# Patient Record
Sex: MALE | Race: WHITE | Employment: STUDENT | URBAN - NONMETROPOLITAN AREA
[De-identification: names, ages, dates, MRNs, and addresses within clinical notes are randomized per-mention and may not be internally consistent; named-entity substitution may affect disease eponyms.]

---
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
## 2021-12-06 ENCOUNTER — TELEPHONE (OUTPATIENT)
Dept: FAMILY MEDICINE CLINIC | Age: 21
End: 2021-12-06

## 2021-12-06 DIAGNOSIS — U07.1 COVID-19: Primary | ICD-10-CM

## 2021-12-06 RX ORDER — HYDROXYCHLOROQUINE SULFATE 200 MG/1
200 TABLET, FILM COATED ORAL 2 TIMES DAILY
Qty: 14 TABLET | Refills: 0 | Status: SHIPPED | OUTPATIENT
Start: 2021-12-06 | End: 2021-12-13

## 2021-12-06 RX ORDER — PREDNISONE 10 MG/1
TABLET ORAL
Qty: 32 TABLET | Refills: 0 | Status: SHIPPED | OUTPATIENT
Start: 2021-12-06 | End: 2022-05-12

## 2021-12-06 RX ORDER — LEVOFLOXACIN 500 MG/1
500 TABLET, FILM COATED ORAL DAILY
Qty: 10 TABLET | Refills: 0 | Status: SHIPPED | OUTPATIENT
Start: 2021-12-06 | End: 2021-12-16

## 2021-12-06 NOTE — TELEPHONE ENCOUNTER
Daily zinc, vitamin D and C  Eat 4-5 small meals daily  Push water and gatorade 60-80 oz daily  Walk 10 minutes every 1.5 hours  Quarantine ends on 12-12-21    Call mom

## 2021-12-06 NOTE — TELEPHONE ENCOUNTER
Contacted patient's mother Read Ana Laura. Mom states patient did a home COVID test and tested positive on Sat 12/4. Mom was just wanting guidelines for patient regarding treatment and quarantine. Discussed with mother. Mom states patient is having body aches and chills, O2 is staying at 96%. Symptoms started on Thursday 12/2. Mom states patient has finals next week in school and is very concerned about not getting better. Mom asking about hydroxychloroquine or anything else that patient can take to help him get better.      Please advise

## 2022-02-23 ENCOUNTER — NURSE ONLY (OUTPATIENT)
Dept: FAMILY MEDICINE CLINIC | Age: 22
End: 2022-02-23
Payer: COMMERCIAL

## 2022-02-23 DIAGNOSIS — J30.1 NON-SEASONAL ALLERGIC RHINITIS DUE TO POLLEN: Primary | ICD-10-CM

## 2022-02-23 PROCEDURE — 95117 IMMUNOTHERAPY INJECTIONS: CPT | Performed by: NURSE PRACTITIONER

## 2022-02-23 PROCEDURE — 99999 PR OFFICE/OUTPT VISIT,PROCEDURE ONLY: CPT | Performed by: NURSE PRACTITIONER

## 2022-02-23 NOTE — PROGRESS NOTES
Administrations This Visit     ALLERGEN EXTRACT 0.2 mL     Admin Date  02/23/2022  16:33 Action  Given Dose  0.2 mL Route  SubCUTAneous Site  Arm Right Administered By  Mick Segura CMA ([address] [place] [address])    Ordering Provider: MARV Martínez CNP    Patient Supplied?: Yes    Comments: 0.5ml subcutaneous right upper arm   vial tr gr wd           Admin Date  02/23/2022  16:34 Action  Given Dose  0.2 mL Route  SubCUTAneous Site  Arm Right Administered By  Mick Segura CMA ([address] [place] [address])    Ordering Provider: MARV Martínez CNP    Patient Supplied?: Yes    Comments: 0.5ml subcutaneous right upper middle arm  vial mold           Admin Date  02/23/2022  16:35 Action  Given Dose  0.2 mL Route  SubCUTAneous Site  Arm Left Administered By  Mick Segura CMA ([address] [place] [address])    Ordering Provider: MARV Martínez CNP    Patient Supplied?: Yes    Comments: 0.5ml subcutaneous left upper arm   vial dmp dmf cat dog                Patient instructed to remain in clinic for 20 minutes after injection and was advised to report any adverse reaction to me immediately. Pt supplied  Right upper injection swelling, swelling did decrease after additional 5 minutes of waiting. Advised pt to apply ice at home and take benadryl if needed   Minimal swelling to two other sites.

## 2022-03-21 ENCOUNTER — NURSE ONLY (OUTPATIENT)
Dept: FAMILY MEDICINE CLINIC | Age: 22
End: 2022-03-21
Payer: COMMERCIAL

## 2022-03-21 DIAGNOSIS — Z51.6 ALLERGY DESENSITIZATION THERAPY: ICD-10-CM

## 2022-03-21 PROCEDURE — 95117 IMMUNOTHERAPY INJECTIONS: CPT | Performed by: NURSE PRACTITIONER

## 2022-03-21 PROCEDURE — 99999 PR OFFICE/OUTPT VISIT,PROCEDURE ONLY: CPT | Performed by: FAMILY MEDICINE

## 2022-03-21 NOTE — PROGRESS NOTES
Administrations This Visit     ALLERGEN EXTRACT 0.2 mL     Admin Date  03/21/2022  15:56 Action  Given Dose  0.2 mL Route  SubCUTAneous Site  Arm Right Administered By  Avril Soto LPN    Ordering Provider: MARV Coleman CNP    Patient Supplied?: Yes    Comments: Red vial C  dose 0.5mL  right middle arm           Admin Date  03/21/2022  15:57 Action  Given Dose  0.2 mL Route  SubCUTAneous Site  Arm Right Administered By  Avril Soto LPN    Ordering Provider: MARV Coleman CNP    Patient Supplied?: Yes    Comments: Vial A red  dose 0.5mL  right upper arm           Admin Date  03/21/2022  15:58 Action  Given Dose  0.2 mL Route  SubCUTAneous Site  Arm Left Administered By  Avril Soto LPN    Ordering Provider: MARV Coleman CNP    Patient Supplied?: Yes    Comments: Red vial B  Dose 0.5mL  left upper arm                Patient instructed to remain in clinic for 20 minutes after injection and was advised to report any adverse reaction to me immediately.

## 2022-04-27 ENCOUNTER — NURSE ONLY (OUTPATIENT)
Dept: FAMILY MEDICINE CLINIC | Age: 22
End: 2022-04-27
Payer: COMMERCIAL

## 2022-04-27 DIAGNOSIS — Z51.6 DESENSITIZATION TO ALLERGENS: ICD-10-CM

## 2022-04-27 PROCEDURE — 95117 IMMUNOTHERAPY INJECTIONS: CPT | Performed by: NURSE PRACTITIONER

## 2022-04-27 PROCEDURE — 99999 PR OFFICE/OUTPT VISIT,PROCEDURE ONLY: CPT | Performed by: NURSE PRACTITIONER

## 2022-04-27 NOTE — PROGRESS NOTES
Administrations This Visit     ALLERGEN EXTRACT 0.2 mL     Admin Date  04/27/2022  16:00 Action  Given Dose  0.2 mL Route  SubCUTAneous Site  Arm Right Administered By  Stefan Kowalski LPN    Ordering Provider: MARV Dorado CNP    Patient Supplied?: Yes    Comments: right middle arm  dose 0.2mL           Admin Date  04/27/2022  16:03 Action  Given Dose  0.2 mL Route  SubCUTAneous Site  Arm Right Administered By  Stefan Kowalski LPN    Ordering Provider: MARV Dorado CNP    Patient Supplied?: Yes    Comments: right arm   dose 0.2mL           Admin Date  04/27/2022  16:04 Action  Given Dose  0.2 mL Route  SubCUTAneous Site  Arm Left Administered By  Stefan Kowalski LPN    Ordering Provider: MARV Dorado CNP    Patient Supplied?: Yes    Comments: left upper arm  dose 0.2mL                Patient instructed to remain in clinic for 20 minutes after injection and was advised to report any adverse reaction to me immediately.

## 2022-05-11 NOTE — PROGRESS NOTES
[address] [place], [place] [address] Stefan Castro  Dept: [phone]  Dept Fax: [phone]  Loc: Rubens Delatorre is a 22 y.o. male who presents today for:  Chief Complaint   Patient presents with    Allergies           HPI:     HPI  Well Adult Physical: Patient here for a comprehensive physical exam.The patient reports no problems  Do you take any herbs or supplements that were not prescribed by a doctor? no Are you taking calcium supplements? no Are you taking aspirin daily? no   History:  Any STD's in the past? none    Has seasonal allergies and a history of asthma. He is no longer using symbicort   Albuterol prn when he is sick. Still receiving allergy shots,       Reviewed chart forpast medical history , surgical history , allergies, social history , family history and medications. Health Maintenance   Topic Date Due    COVID-19 Vaccine (1) Never done    Pneumococcal 0-64 years Vaccine (1 - PCV) Never done    HPV vaccine (1 - Male 2-dose series) Never done    HIV screen  Never done    Hepatitis C screen  Never done    Flu vaccine (Season Ended) 09/01/2022    Depression Screen  05/12/2023    DTaP/Tdap/Td vaccine (7 - Td or Tdap) 06/24/2024    Hepatitis B vaccine  Completed    Hib vaccine  Completed    Varicella vaccine  Completed    Meningococcal (ACWY) vaccine  Completed    Hepatitis A vaccine  Aged Out       Subjective:      Constitutional:Negative for fever, chills, diaphoresis, activity change, appetite change and fatigue. HENT: Negative for hearing loss, ear pain, congestion, sore throat, rhinorrhea, postnasal drip and ear discharge. Eyes: Negative for photophobia and visual disturbance. Respiratory: Negative for cough, chest tightness, shortness of breath and wheezing. Cardiovascular: Negative for chest pain and leg swelling.    Gastrointestinal: Negative for nausea, vomiting, abdominal pain, diarrhea and constipation. Genitourinary: Negative for dysuria, urgency and frequency. Neurological: Negative for weakness, light-headedness and headaches. Psychiatric/Behavioral: Negative for sleep disturbance.      :     Vitals:    05/12/22 1007   BP: 120/70   Site: Left Upper Arm   Position: Sitting   Cuff Size: Large Adult   Pulse: 63   Resp: 16   Temp: 97.9 °F (36.6 °C)   TempSrc: Temporal   SpO2: 96%   Weight: 228 lb (103.4 kg)   Height: 6' 5.01\" (1.956 m)     Wt Readings from Last 3 Encounters:   05/12/22 228 lb (103.4 kg)   07/25/17 191 lb 9.6 oz (86.9 kg) (96 %, Z= 1.76)*   02/14/17 179 lb 9.6 oz (81.5 kg) (94 %, Z= 1.59)*     * Growth percentiles are based on [facility] (Boys, 2-20 Years) data. Physical Exam  Physical Exam   Constitutional: Vital signs are normal. He appears well-developed and well-nourished. He is active. HENT:   Head: Normocephalic and atraumatic. Right Ear: Tympanic membrane, external ear and ear canal normal. No drainage or tenderness. Left Ear: Tympanic membrane, external ear and ear canal normal. No drainage or tenderness. Nose: Nose normal. No mucosal edema or rhinorrhea. Mouth/Throat: Uvula is midline, oropharynx is clear and moist and mucous membranes are normal. Mucous membranes are not pale. Normal dentition. No posterior oropharyngeal edema or posterior oropharyngeal erythema. Eyes: Lids are normal. Right eye exhibits no chemosis and no discharge. Left eye exhibits no chemosis and no drainage. Right conjunctiva has no hemorrhage. Left conjunctiva has no hemorrhage. Right eye exhibits normal extraocular motion. Left eye exhibits normal extraocular motion. Right pupil is round and reactive. Left pupil is round and reactive. Pupils are equal.   Cardiovascular: Normal rate, regular rhythm, S1 normal, S2 normal and normal heart sounds. Exam reveals no gallop. No murmur heard.   Pulmonary/Chest: Effort normal and breath sounds normal. No respiratory distress. He has no wheezes. He has no rhonchi. He has no rales. Abdominal: Soft. Normal appearance and bowel sounds are normal. He exhibits no distension and no mass. There is no hepatosplenomegaly. No tenderness. He has no rigidity, no rebound and no guarding. No hernia. Musculoskeletal:        Right lower leg: He exhibits no edema. Left lower leg: He exhibits no edema. Neurological: He is alert. Oriented and pleasent        Assessment/Plan   Minus Meuse was seen today for allergies. Diagnoses and all orders for this visit:    Routine general medical examination at a health care facility  -     CBC; Future  -     Comprehensive Metabolic Panel, Fasting; Future  -     Lipid Panel; Future  -     TSH with Reflex; Future    Non-seasonal allergic rhinitis due to pollen    Mild intermittent asthma without complication    No change to medications   Continue healthy diet and exercise    Discussed use, benefit, and side effectsof prescribed medications. All patient questions answered. Pt voiced understanding. Reviewed health maintenance. Instructed to continue current medications, diet and exercise. Patient agreed with treatment plan. Followup as directed.      Electronically signed by Edel Kern MD

## 2022-05-12 ENCOUNTER — OFFICE VISIT (OUTPATIENT)
Dept: FAMILY MEDICINE CLINIC | Age: 22
End: 2022-05-12
Payer: COMMERCIAL

## 2022-05-12 VITALS
HEART RATE: 63 BPM | HEIGHT: 77 IN | SYSTOLIC BLOOD PRESSURE: 120 MMHG | TEMPERATURE: 97.9 F | RESPIRATION RATE: 16 BRPM | DIASTOLIC BLOOD PRESSURE: 70 MMHG | WEIGHT: 228 LBS | BODY MASS INDEX: 26.92 KG/M2 | OXYGEN SATURATION: 96 %

## 2022-05-12 DIAGNOSIS — J30.1 NON-SEASONAL ALLERGIC RHINITIS DUE TO POLLEN: ICD-10-CM

## 2022-05-12 DIAGNOSIS — Z00.00 ROUTINE GENERAL MEDICAL EXAMINATION AT A HEALTH CARE FACILITY: Primary | ICD-10-CM

## 2022-05-12 DIAGNOSIS — J45.20 MILD INTERMITTENT ASTHMA WITHOUT COMPLICATION: ICD-10-CM

## 2022-05-12 PROCEDURE — 99385 PREV VISIT NEW AGE 18-39: CPT | Performed by: FAMILY MEDICINE

## 2022-05-12 SDOH — ECONOMIC STABILITY: FOOD INSECURITY: WITHIN THE PAST 12 MONTHS, THE FOOD YOU BOUGHT JUST DIDN'T LAST AND YOU DIDN'T HAVE MONEY TO GET MORE.: NEVER TRUE

## 2022-05-12 SDOH — ECONOMIC STABILITY: FOOD INSECURITY: WITHIN THE PAST 12 MONTHS, YOU WORRIED THAT YOUR FOOD WOULD RUN OUT BEFORE YOU GOT MONEY TO BUY MORE.: NEVER TRUE

## 2022-05-12 ASSESSMENT — PATIENT HEALTH QUESTIONNAIRE - PHQ9
SUM OF ALL RESPONSES TO PHQ QUESTIONS 1-9: 0
SUM OF ALL RESPONSES TO PHQ QUESTIONS 1-9: 0
1. LITTLE INTEREST OR PLEASURE IN DOING THINGS: 0
2. FEELING DOWN, DEPRESSED OR HOPELESS: 0
SUM OF ALL RESPONSES TO PHQ QUESTIONS 1-9: 0
SUM OF ALL RESPONSES TO PHQ QUESTIONS 1-9: 0
SUM OF ALL RESPONSES TO PHQ9 QUESTIONS 1 & 2: 0

## 2022-05-12 ASSESSMENT — SOCIAL DETERMINANTS OF HEALTH (SDOH): HOW HARD IS IT FOR YOU TO PAY FOR THE VERY BASICS LIKE FOOD, HOUSING, MEDICAL CARE, AND HEATING?: NOT HARD AT ALL

## 2022-06-10 ENCOUNTER — NURSE ONLY (OUTPATIENT)
Dept: FAMILY MEDICINE CLINIC | Age: 22
End: 2022-06-10
Payer: COMMERCIAL

## 2022-06-10 DIAGNOSIS — Z51.6 ALLERGY DESENSITIZATION THERAPY: ICD-10-CM

## 2022-06-10 PROCEDURE — 99999 PR OFFICE/OUTPT VISIT,PROCEDURE ONLY: CPT | Performed by: NURSE PRACTITIONER

## 2022-06-10 PROCEDURE — 95117 IMMUNOTHERAPY INJECTIONS: CPT | Performed by: NURSE PRACTITIONER

## 2022-06-10 NOTE — PROGRESS NOTES
Administrations This Visit     ALLERGEN EXTRACT 0.2 mL     Admin Date  06/10/2022  10:30 Action  Given Dose  0.2 mL Route  SubCUTAneous Site  Arm Right Administered By  Dawit Ferreira LPN    Ordering Provider: MARV Tillman CNP    Patient Supplied?: Yes    Comments: vial B  right middle arm   dose 0.3mL           Admin Date  06/10/2022  10:31 Action  Given Dose  0.2 mL Route  SubCUTAneous Site  Arm Right Administered By  Dawit Ferreira LPN    Ordering Provider: MARV Tillman CNP    Patient Supplied?: Yes    Comments: Vial A  dose 0.3mL  right upper arm           Admin Date  06/10/2022  10:32 Action  Given Dose  0.2 mL Route  SubCUTAneous Site  Arm Left Administered By  Dawit Ferreira LPN    Ordering Provider: MARV Tillman CNP    Patient Supplied?: Yes    Comments: vial C  left upper arm   dose 0.3mL                Patient instructed to remain in clinic for 20 minutes after injection and was advised to report any adverse reaction to me immediately.

## 2022-09-20 ENCOUNTER — NURSE ONLY (OUTPATIENT)
Dept: FAMILY MEDICINE CLINIC | Age: 22
End: 2022-09-20
Payer: COMMERCIAL

## 2022-09-20 PROCEDURE — 95117 IMMUNOTHERAPY INJECTIONS: CPT | Performed by: NURSE PRACTITIONER

## 2022-10-11 ENCOUNTER — NURSE ONLY (OUTPATIENT)
Dept: FAMILY MEDICINE CLINIC | Age: 22
End: 2022-10-11

## 2022-10-11 DIAGNOSIS — Z51.6 ALLERGY DESENSITIZATION THERAPY: Primary | ICD-10-CM

## 2022-10-11 PROCEDURE — 99999 PR OFFICE/OUTPT VISIT,PROCEDURE ONLY: CPT | Performed by: NURSE PRACTITIONER

## 2022-10-31 ENCOUNTER — NURSE ONLY (OUTPATIENT)
Dept: FAMILY MEDICINE CLINIC | Age: 22
End: 2022-10-31
Payer: COMMERCIAL

## 2022-10-31 DIAGNOSIS — Z51.6 DESENSITIZATION TO ALLERGY SHOT: Primary | ICD-10-CM

## 2022-10-31 PROCEDURE — 95117 IMMUNOTHERAPY INJECTIONS: CPT | Performed by: NURSE PRACTITIONER

## 2022-10-31 PROCEDURE — 99999 PR OFFICE/OUTPT VISIT,PROCEDURE ONLY: CPT | Performed by: NURSE PRACTITIONER

## 2022-10-31 NOTE — PROGRESS NOTES
Administrations This Visit       ALLERGEN EXTRACT 0.2 mL       Admin Date  10/31/2022  16:00 Action  Given Dose  0.2 mL Route  SubCUTAneous Site  Arm Right Administered By  Isidro Suresh LPN    Ordering Provider: MARV Niño CNP    Patient Supplied?: Yes    Comments: Vial C  dose 0.5mL  right middle arm               Admin Date  10/31/2022  16:10 Action  Given Dose  0.2 mL Route  SubCUTAneous Site  Arm Left Administered By  Isidro Suresh LPN    Ordering Provider: MARV Niño CNP    Patient Supplied?: Yes    Comments: Vial A  does 0.5ml  left upper arm               Admin Date  10/31/2022  16:11 Action  Given Dose  0.2 mL Route  SubCUTAneous Site  Arm Right Administered By  Isidro Suresh LPN    Ordering Provider: MARV Niño CNP    Patient Supplied?: Yes    Comments: vial B   dose 0.5mL   right upper arm                    Patient instructed to remain in clinic for 20 minutes after injection and was advised to report any adverse reaction to me immediately.

## 2022-11-29 ENCOUNTER — NURSE ONLY (OUTPATIENT)
Dept: FAMILY MEDICINE CLINIC | Age: 22
End: 2022-11-29

## 2022-11-29 DIAGNOSIS — Z51.6 DESENSITIZATION TO ALLERGENS: ICD-10-CM

## 2022-11-29 DIAGNOSIS — J30.81 ALLERGIC RHINITIS DUE TO ANIMAL HAIR AND DANDER: Primary | ICD-10-CM

## 2022-11-29 DIAGNOSIS — J30.1 ALLERGIC RHINITIS DUE TO POLLEN, UNSPECIFIED SEASONALITY: ICD-10-CM

## 2022-11-29 PROCEDURE — 99999 PR OFFICE/OUTPT VISIT,PROCEDURE ONLY: CPT | Performed by: NURSE PRACTITIONER

## 2022-11-29 NOTE — PROGRESS NOTES
Administrations This Visit       ALLERGEN EXTRACT 0.2 mL       Admin Date  11/29/2022  15:32 Action  Given Dose  0.2 mL Route  SubCUTAneous Site  Arm Left Administered By  Nelida Macias LPN    Ordering Provider: MARV Sow CNP    Patient Supplied?: Yes    Comments: 0.5mL  vial [id number]  exp: 3/31/23               Admin Date  11/29/2022  15:33 Action  Given Dose  0.2 mL Route  SubCUTAneous Site  Arm Right Administered By  Nelida Macias LPN    Ordering Provider: MARV Sow CNP    Patient Supplied?: Yes    Comments: upper  vial [id number]  exp: 3/31/23               Admin Date  11/29/2022  15:34 Action  Given Dose  0.2 mL Route  SubCUTAneous Site  Arm Right Administered By  Nelida Macias LPN    Ordering Provider: MARV Sow CNP    Patient Supplied?: Yes    Comments: lower  0.5mL  vial [id number]  exp: 3/31/23                    Patient instructed to remain in clinic for 20 minutes after injection and was advised to report any adverse reaction to me immediately. No redness, wheal, or reactions noted.

## 2022-12-20 ENCOUNTER — NURSE ONLY (OUTPATIENT)
Dept: FAMILY MEDICINE CLINIC | Age: 22
End: 2022-12-20
Payer: COMMERCIAL

## 2022-12-20 DIAGNOSIS — Z51.6 DESENSITIZATION TO ALLERGENS: Primary | ICD-10-CM

## 2022-12-20 PROCEDURE — 95117 IMMUNOTHERAPY INJECTIONS: CPT | Performed by: NURSE PRACTITIONER

## 2022-12-20 PROCEDURE — 99999 PR OFFICE/OUTPT VISIT,PROCEDURE ONLY: CPT | Performed by: NURSE PRACTITIONER

## 2023-06-27 RX ORDER — ALBUTEROL SULFATE 90 UG/1
AEROSOL, METERED RESPIRATORY (INHALATION)
Qty: 3 EACH | Refills: 3 | Status: SHIPPED | OUTPATIENT
Start: 2023-06-27

## 2023-08-14 ENCOUNTER — NURSE ONLY (OUTPATIENT)
Dept: FAMILY MEDICINE CLINIC | Age: 23
End: 2023-08-14
Payer: COMMERCIAL

## 2023-08-14 DIAGNOSIS — Z29.8 IMMUNOTHERAPY: Primary | ICD-10-CM

## 2023-08-14 PROCEDURE — 99999 PR OFFICE/OUTPT VISIT,PROCEDURE ONLY: CPT | Performed by: NURSE PRACTITIONER

## 2023-08-14 PROCEDURE — 95117 IMMUNOTHERAPY INJECTIONS: CPT | Performed by: NURSE PRACTITIONER

## 2023-08-15 NOTE — PROGRESS NOTES
Administrations This Visit       ALLERGEN EXTRACT 0.2 mL       Admin Date  08/15/2023  16:49 Action  Given Dose  0.5 mL Route  SubCUTAneous Site  Arm Right Administered By  Abel Foster LPN    Ordering Provider: MARV West CNP    Patient Supplied?: Yes               Admin Date  08/15/2023  16:50 Action  Given Dose  0.5 mL Route  SubCUTAneous Site  Arm Left Administered By  Abel Foster LPN    Ordering Provider: MARV West CNP    Patient Supplied?: Yes               Admin Date  08/15/2023  16:51 Action  Given Dose  0.5 mL Route  SubCUTAneous Site  Arm Right Administered By  Abel Foster LPN    Ordering Provider: MARV West CNP    Patient Supplied?: Yes                    Patient instructed to remain in clinic for 20 minutes after injection and was advised to report any adverse reaction to me immediately.

## 2023-08-22 ENCOUNTER — TELEPHONE (OUTPATIENT)
Dept: FAMILY MEDICINE CLINIC | Age: 23
End: 2023-08-22

## 2023-08-22 RX ORDER — METHYLPREDNISOLONE 4 MG/1
TABLET ORAL
Qty: 1 KIT | Refills: 0 | Status: SHIPPED | OUTPATIENT
Start: 2023-08-22 | End: 2023-08-28

## 2023-08-22 NOTE — TELEPHONE ENCOUNTER
Pt mom called stating that pt has poison ivy on legs and hands   Started 4 days.   Tried calamine lotion and did not help

## 2025-01-07 ENCOUNTER — TELEPHONE (OUTPATIENT)
Dept: FAMILY MEDICINE CLINIC | Age: 25
End: 2025-01-07

## 2025-01-07 ENCOUNTER — OFFICE VISIT (OUTPATIENT)
Dept: FAMILY MEDICINE CLINIC | Age: 25
End: 2025-01-07

## 2025-01-07 VITALS
BODY MASS INDEX: 27.39 KG/M2 | OXYGEN SATURATION: 98 % | TEMPERATURE: 97.8 F | DIASTOLIC BLOOD PRESSURE: 68 MMHG | HEART RATE: 67 BPM | SYSTOLIC BLOOD PRESSURE: 116 MMHG | HEIGHT: 77 IN | WEIGHT: 232 LBS | RESPIRATION RATE: 16 BRPM

## 2025-01-07 DIAGNOSIS — H66.003 NON-RECURRENT ACUTE SUPPURATIVE OTITIS MEDIA OF BOTH EARS WITHOUT SPONTANEOUS RUPTURE OF TYMPANIC MEMBRANES: Primary | ICD-10-CM

## 2025-01-07 DIAGNOSIS — K12.1 STOMATITIS: ICD-10-CM

## 2025-01-07 DIAGNOSIS — J45.20 MILD INTERMITTENT ASTHMA WITHOUT COMPLICATION: ICD-10-CM

## 2025-01-07 RX ORDER — IBUPROFEN 200 MG
400 TABLET ORAL EVERY 6 HOURS PRN
COMMUNITY

## 2025-01-07 RX ORDER — AMOXICILLIN AND CLAVULANATE POTASSIUM 500; 125 MG/1; MG/1
1 TABLET, FILM COATED ORAL 3 TIMES DAILY
Qty: 30 TABLET | Refills: 0 | Status: SHIPPED | OUTPATIENT
Start: 2025-01-07 | End: 2025-01-17

## 2025-01-07 RX ORDER — ALBUTEROL SULFATE 90 UG/1
2 INHALANT RESPIRATORY (INHALATION) EVERY 6 HOURS PRN
Qty: 18 G | Refills: 0 | Status: SHIPPED | OUTPATIENT
Start: 2025-01-07 | End: 2026-01-07

## 2025-01-07 SDOH — ECONOMIC STABILITY: FOOD INSECURITY: WITHIN THE PAST 12 MONTHS, THE FOOD YOU BOUGHT JUST DIDN'T LAST AND YOU DIDN'T HAVE MONEY TO GET MORE.: NEVER TRUE

## 2025-01-07 SDOH — ECONOMIC STABILITY: FOOD INSECURITY: WITHIN THE PAST 12 MONTHS, YOU WORRIED THAT YOUR FOOD WOULD RUN OUT BEFORE YOU GOT MONEY TO BUY MORE.: NEVER TRUE

## 2025-01-07 SDOH — ECONOMIC STABILITY: INCOME INSECURITY: HOW HARD IS IT FOR YOU TO PAY FOR THE VERY BASICS LIKE FOOD, HOUSING, MEDICAL CARE, AND HEATING?: NOT HARD AT ALL

## 2025-01-07 ASSESSMENT — ENCOUNTER SYMPTOMS
EYES NEGATIVE: 1
GASTROINTESTINAL NEGATIVE: 1
RESPIRATORY NEGATIVE: 1

## 2025-01-07 ASSESSMENT — PATIENT HEALTH QUESTIONNAIRE - PHQ9
1. LITTLE INTEREST OR PLEASURE IN DOING THINGS: NOT AT ALL
2. FEELING DOWN, DEPRESSED OR HOPELESS: NOT AT ALL
SUM OF ALL RESPONSES TO PHQ9 QUESTIONS 1 & 2: 0
SUM OF ALL RESPONSES TO PHQ QUESTIONS 1-9: 0

## 2025-01-07 NOTE — PROGRESS NOTES
Oliver Price is a 24 y.o. male who presents today for :  Chief Complaint   Patient presents with    Congestion    Ear Pain     Left ear    Headache    Generalized Body Aches     Vitals:    01/07/25 0959   BP: 116/68   Pulse: 67   Resp: 16   Temp: 97.8 °F (36.6 °C)   SpO2: 98%       HPI:     History of Present Illness  The patient presents for evaluation of illness.    He has been experiencing symptoms since Thursday night, which began during a trip to the [place]. Initially, he attributed his discomfort to an uncomfortable bed. However, upon waking, he experienced body aches, lightheadedness, and a headache. He also reported chills and a sore throat. Yesterday morning, he experienced a transient loss of balance for approximately 15 minutes, describing the sensation as room spinning. He has not had a COVID-19 test. He has been managing his symptoms with ibuprofen, which provides some relief. He has not sought medical attention for some time and is not currently on any medications. He possesses an inhaler but has not used it recently. He reports no significant cough or ear pain. His symptoms have remained consistent without any noticeable improvement or worsening. He has not been producing any sputum. He also reports difficulty in equalizing pressure in his left ear during a flight, which has resulted in persistent discomfort. He has observed red dots on his hand that are sensitive to touch.    SOCIAL HISTORY  He graduated in December 2024 and will be starting a full-time job on 01/13/2025.    MEDICATIONS  Current: Ibuprofen.         Patient Active Problem List   Diagnosis    Asthma     Past Medical History:   Diagnosis Date    Allergic     Asthma       No past surgical history on file.  Family History   Problem Relation Age of Onset    Other Maternal Grandmother         Brain Bleed    Heart Disease Maternal Grandmother         Irregular Heart Rate    Cancer Maternal Grandfather         Lymphoma    Diabetes

## 2025-01-07 NOTE — TELEPHONE ENCOUNTER
Pt mom called asking for medications to be switched from [facility] to [facility]   Called into [facility] and BRAULIO to cancel at [facility]

## 2025-06-02 ENCOUNTER — OFFICE VISIT (OUTPATIENT)
Dept: FAMILY MEDICINE CLINIC | Age: 25
End: 2025-06-02
Payer: COMMERCIAL

## 2025-06-02 VITALS
RESPIRATION RATE: 15 BRPM | SYSTOLIC BLOOD PRESSURE: 120 MMHG | OXYGEN SATURATION: 98 % | TEMPERATURE: 98.7 F | BODY MASS INDEX: 27.15 KG/M2 | DIASTOLIC BLOOD PRESSURE: 70 MMHG | HEART RATE: 70 BPM | WEIGHT: 229 LBS

## 2025-06-02 DIAGNOSIS — H65.06 RECURRENT ACUTE SEROUS OTITIS MEDIA OF BOTH EARS: Primary | ICD-10-CM

## 2025-06-02 PROCEDURE — 99213 OFFICE O/P EST LOW 20 MIN: CPT | Performed by: NURSE PRACTITIONER

## 2025-06-02 RX ORDER — AMOXICILLIN AND CLAVULANATE POTASSIUM 500; 125 MG/1; MG/1
1 TABLET, FILM COATED ORAL 3 TIMES DAILY
Qty: 30 TABLET | Refills: 0 | Status: SHIPPED | OUTPATIENT
Start: 2025-06-02 | End: 2025-06-12

## 2025-06-02 SDOH — ECONOMIC STABILITY: FOOD INSECURITY: WITHIN THE PAST 12 MONTHS, THE FOOD YOU BOUGHT JUST DIDN'T LAST AND YOU DIDN'T HAVE MONEY TO GET MORE.: NEVER TRUE

## 2025-06-02 SDOH — ECONOMIC STABILITY: FOOD INSECURITY: WITHIN THE PAST 12 MONTHS, YOU WORRIED THAT YOUR FOOD WOULD RUN OUT BEFORE YOU GOT MONEY TO BUY MORE.: NEVER TRUE

## 2025-06-02 ASSESSMENT — ENCOUNTER SYMPTOMS
GASTROINTESTINAL NEGATIVE: 1
RESPIRATORY NEGATIVE: 1
SINUS PRESSURE: 1
EYES NEGATIVE: 1

## 2025-06-02 NOTE — PROGRESS NOTES
Oliver Price is a 24 y.o. male who presents today for :  Chief Complaint   Patient presents with    Congestion     Symptoms started 6 days ago but have improved     Ear Fullness     Bilateral ears     Cough     Vitals:    06/02/25 1411   BP: 120/70   Pulse: 70   Resp: 15   Temp: 98.7 °F (37.1 °C)   SpO2: 98%       HPI:     History of Present Illness  The patient presents for evaluation of ear troubles.    He has been experiencing bilateral ear discomfort, which he attributes to an illness that began on Wednesday. On Saturday, he noticed an echo-like sound in his ears upon awakening, which progressed to a sensation akin to being underwater. This symptom persisted until Sunday night. Currently, he reports intermittent blockage in one ear, while the other appears normal. He experienced ear pain on Saturday night. He suspects a potential COVID-19 infection due to these symptoms. He is uncertain about snoring but acknowledges breathing through his mouth, resulting in louder respiration. He had an ear infection in 01/2025, which has since resolved.         Patient Active Problem List   Diagnosis    Asthma     Past Medical History:   Diagnosis Date    Allergic     Asthma       History reviewed. No pertinent surgical history.  Family History   Problem Relation Age of Onset    Heart Disease Maternal Grandmother         Irregular Heart Rate    Cancer Maternal Grandfather         Lymphoma    Diabetes Paternal Grandmother     Cancer Paternal Grandfather         Skin Cancer    Stroke Paternal Grandfather      Social History     Tobacco Use    Smoking status: Never    Smokeless tobacco: Never   Substance Use Topics    Alcohol use: No      Current Outpatient Medications   Medication Sig Dispense Refill    amoxicillin-clavulanate (AUGMENTIN) 500-125 MG per tablet Take 1 tablet by mouth 3 times daily for 10 days 30 tablet 0    ibuprofen (ADVIL;MOTRIN) 200 MG tablet Take 2 tablets by mouth every 6 hours as needed for Pain

## 2025-06-09 ENCOUNTER — PATIENT MESSAGE (OUTPATIENT)
Dept: FAMILY MEDICINE CLINIC | Age: 25
End: 2025-06-09

## 2025-06-09 DIAGNOSIS — H65.06 RECURRENT ACUTE SEROUS OTITIS MEDIA OF BOTH EARS: Primary | ICD-10-CM

## 2025-06-09 RX ORDER — METHYLPREDNISOLONE 4 MG/1
TABLET ORAL
Qty: 1 KIT | Refills: 0 | Status: SHIPPED | OUTPATIENT
Start: 2025-06-09 | End: 2025-06-15

## 2025-06-09 RX ORDER — CEFDINIR 300 MG/1
300 CAPSULE ORAL 2 TIMES DAILY
Qty: 20 CAPSULE | Refills: 0 | Status: SHIPPED | OUTPATIENT
Start: 2025-06-09 | End: 2025-06-19